# Patient Record
Sex: FEMALE | Race: BLACK OR AFRICAN AMERICAN | NOT HISPANIC OR LATINO | Employment: UNEMPLOYED | URBAN - METROPOLITAN AREA
[De-identification: names, ages, dates, MRNs, and addresses within clinical notes are randomized per-mention and may not be internally consistent; named-entity substitution may affect disease eponyms.]

---

## 2024-09-25 ENCOUNTER — CLINICAL SUPPORT (OUTPATIENT)
Dept: OPHTHALMOLOGY | Facility: CLINIC | Age: 59
End: 2024-09-25

## 2024-09-25 ENCOUNTER — HOSPITAL ENCOUNTER (EMERGENCY)
Facility: HOSPITAL | Age: 59
Discharge: HOME | End: 2024-09-25
Attending: EMERGENCY MEDICINE
Payer: MEDICAID

## 2024-09-25 VITALS
RESPIRATION RATE: 18 BRPM | BODY MASS INDEX: 40.97 KG/M2 | WEIGHT: 240 LBS | HEIGHT: 64 IN | DIASTOLIC BLOOD PRESSURE: 79 MMHG | HEART RATE: 71 BPM | TEMPERATURE: 98 F | SYSTOLIC BLOOD PRESSURE: 161 MMHG | OXYGEN SATURATION: 100 %

## 2024-09-25 VITALS — BODY MASS INDEX: 40.95 KG/M2 | HEIGHT: 64 IN | WEIGHT: 239.88 LBS

## 2024-09-25 DIAGNOSIS — H53.8 BLURRY VISION, RIGHT EYE: Primary | ICD-10-CM

## 2024-09-25 DIAGNOSIS — H04.123 DRY EYE SYNDROME OF BOTH EYES: ICD-10-CM

## 2024-09-25 DIAGNOSIS — H43.812 POSTERIOR VITREOUS DEGENERATION, LEFT: ICD-10-CM

## 2024-09-25 DIAGNOSIS — H25.813 COMBINED FORMS OF AGE-RELATED CATARACT OF BOTH EYES: ICD-10-CM

## 2024-09-25 DIAGNOSIS — H02.889 MEIBOMIAN GLAND DYSFUNCTION (MGD): ICD-10-CM

## 2024-09-25 DIAGNOSIS — Z00.00 HEALTHCARE MAINTENANCE: Primary | ICD-10-CM

## 2024-09-25 DIAGNOSIS — H43.392 VITREOUS FLOATERS OF LEFT EYE: ICD-10-CM

## 2024-09-25 PROCEDURE — 99213 OFFICE O/P EST LOW 20 MIN: CPT | Mod: PBBFAC,PN,25

## 2024-09-25 PROCEDURE — 92136 OPHTHALMIC BIOMETRY: CPT | Mod: PBBFAC,PN

## 2024-09-25 PROCEDURE — 92134 CPTRZ OPH DX IMG PST SGM RTA: CPT | Mod: PBBFAC,PN | Performed by: OPHTHALMOLOGY

## 2024-09-25 PROCEDURE — 99283 EMERGENCY DEPT VISIT LOW MDM: CPT | Mod: 25,27

## 2024-09-25 PROCEDURE — 92025 CPTRIZED CORNEAL TOPOGRAPHY: CPT | Mod: PBBFAC,PN | Performed by: OPHTHALMOLOGY

## 2024-09-25 PROCEDURE — 92134 CPTRZ OPH DX IMG PST SGM RTA: CPT | Mod: PBBFAC,PN

## 2024-09-25 PROCEDURE — 92136 OPHTHALMIC BIOMETRY: CPT | Mod: PBBFAC,PN | Performed by: OPHTHALMOLOGY

## 2024-09-25 PROCEDURE — 25000003 PHARM REV CODE 250: Performed by: EMERGENCY MEDICINE

## 2024-09-25 RX ORDER — TETRACAINE HYDROCHLORIDE 5 MG/ML
2 SOLUTION OPHTHALMIC
Status: COMPLETED | OUTPATIENT
Start: 2024-09-25 | End: 2024-09-25

## 2024-09-25 RX ADMIN — TETRACAINE HYDROCHLORIDE 2 DROP: 5 SOLUTION OPHTHALMIC at 11:09

## 2024-09-25 RX ADMIN — FLUORESCEIN SODIUM 1 EACH: 1 STRIP OPHTHALMIC at 11:09

## 2024-09-25 NOTE — PROGRESS NOTES
HPI     Spots and/or Floaters     Additional comments: In OS x two weeks            Comments    ED follow up  With no vision changes , flashes of light or veil   Patient has never had an eye exam and has not had a PCP or doctor's   appointment since she was a child.  Would like to be referred to Internal medicine if possible          Last edited by Jerica Chaney MA on 9/25/2024  2:41 PM.            Assessment /Plan     For exam results, see Encounter Report.    There are no diagnoses linked to this encounter.    OCT RNFL 9/25/24  OD: 88 all green  OS: 88 all green    OCT Mac 9/25/24  OD: Normal foveal contour, no IRF/SRF  OS: Normal foveal contour, no IRF/SRF    Corneal Topography 9/25/24  OD: 43.04, 42.64, 43.45 (0.81D astig)  OS: 43.64, 43.28, 44.01 (0.73D astig)    Symptomatic PVD OS  - 2 week history of single floater OS  - denies flashes/shower of floaters/curtains/veils  - PVD with Gonzalez ring noted on exam, no h/t/d on DFE, fundus photos today  - RD precautions discussed, discussed increased likelihood occurring OD  - RTC 3-4 weeks for repeat DFE    2. YUMIKO      MGD  - PEE OU  - start Ats QID, WC/LS    3. NSC + CSC OU  - 2+ NSC and 2+ CSC OD  - 1-2+ NSC/CSC OS  - Likely VS OD, patient symptomatic with glare  - BCVA 20/50 // 20/25  - IOL Calcs done today, RTC 3-4 weeks for cat eval  - New Mrx provided today    4. Healthcare Maintenance  - Pt not seen doctor since a child  - referred to IM to establish care    RTC 3-4 weeks for repeat DFE and cat eval

## 2024-09-25 NOTE — ED PROVIDER NOTES
Encounter Date: 9/25/2024       History     Chief Complaint   Patient presents with    Eye Problem     C/o floaters in left eye for 2 weeks and right eye blurry vision x 1 week. No other complaints at this time.      The history is provided by the patient.   Eye Problem  This is a new problem. The current episode started more than 1 week ago. The problem occurs daily. The problem has not changed since onset.Pertinent negatives include no chest pain, no abdominal pain, no headaches and no shortness of breath. Nothing aggravates the symptoms. Nothing relieves the symptoms.     Review of patient's allergies indicates:  No Known Allergies  History reviewed. No pertinent past medical history.  History reviewed. No pertinent surgical history.  No family history on file.  Social History     Tobacco Use    Smoking status: Never    Smokeless tobacco: Never   Substance Use Topics    Alcohol use: Not Currently    Drug use: Not Currently     Review of Systems   Constitutional:  Negative for fever.   HENT: Negative.  Negative for congestion and sore throat.    Eyes: Negative.    Respiratory: Negative.  Negative for cough and shortness of breath.    Cardiovascular:  Negative for chest pain.   Gastrointestinal:  Negative for abdominal pain, nausea and vomiting.   Genitourinary:  Negative for dysuria.   Neurological:  Negative for weakness, numbness and headaches.   Psychiatric/Behavioral:  Negative for confusion.        Physical Exam     Initial Vitals [09/25/24 1114]   BP Pulse Resp Temp SpO2   (!) 161/79 71 18 97.8 °F (36.6 °C) 100 %      MAP       --         Physical Exam    Constitutional: She appears well-developed and well-nourished. No distress.   HENT:   Head: Normocephalic and atraumatic.   Eyes: Conjunctivae and lids are normal. Pupils are equal, round, and reactive to light. Lids are everted and swept, no foreign bodies found.   Slit lamp exam:       The right eye shows no corneal abrasion and no fluorescein uptake.         The left eye shows no corneal abrasion and no fluorescein uptake.   Pressures - OS - 25  OD 28   Neck: Neck supple.   Normal range of motion.  Cardiovascular:  Normal rate, regular rhythm and normal heart sounds.           Pulmonary/Chest: Breath sounds normal.   Abdominal: Abdomen is soft. Bowel sounds are normal. She exhibits no distension. There is no abdominal tenderness. There is no rebound.   Musculoskeletal:         General: No edema. Normal range of motion.      Cervical back: Normal range of motion and neck supple.     Neurological: She is alert and oriented to person, place, and time. She has normal strength.   Skin: Skin is warm and dry.   Psychiatric: She has a normal mood and affect.         ED Course   Procedures  Labs Reviewed - No data to display       Imaging Results    None          Medications   fluorescein ophthalmic strip 1 each (1 each Right Eye Given 9/25/24 1132)   TETRAcaine HCl (PF) 0.5 % Drop 2 drop (2 drops Right Eye Given by Other 9/25/24 1132)     Medical Decision Making  DDx: blurry vision, floaters    Amount and/or Complexity of Data Reviewed  Discussion of management or test interpretation with external provider(s): Patient with floaters to left eye for the last two weeks, and blurry vision to the right eye for the last week.  Workup in ED un revealing except for slightly elevated IOP.  Discussed case with Ophthalmology resident, and they will see her in clinic today.      Risk  Prescription drug management.                                      Clinical Impression:  Final diagnoses:  [H53.8] Blurry vision, right eye (Primary)  [H43.392] Vitreous floaters of left eye          ED Disposition Condition    Discharge Stable          ED Prescriptions    None       Follow-up Information       Follow up With Specialties Details Why Contact Info    Ophthalmology clinic                 Te De La Cruz MD  09/25/24 [id number]

## 2024-10-22 ENCOUNTER — OFFICE VISIT (OUTPATIENT)
Dept: OPHTHALMOLOGY | Facility: CLINIC | Age: 59
End: 2024-10-22
Payer: MEDICAID

## 2024-10-22 VITALS — WEIGHT: 239.88 LBS | BODY MASS INDEX: 40.95 KG/M2 | HEIGHT: 64 IN

## 2024-10-22 DIAGNOSIS — H04.123 DRY EYE SYNDROME OF BOTH EYES: ICD-10-CM

## 2024-10-22 DIAGNOSIS — H25.813 COMBINED FORMS OF AGE-RELATED CATARACT OF BOTH EYES: ICD-10-CM

## 2024-10-22 DIAGNOSIS — H43.812 POSTERIOR VITREOUS DEGENERATION, LEFT: Primary | ICD-10-CM

## 2024-10-22 PROCEDURE — 99212 OFFICE O/P EST SF 10 MIN: CPT | Mod: PBBFAC,PN

## 2024-10-22 NOTE — PROGRESS NOTES
HPI    RTC 3-4 weeks for repeat DFE and cat eval  Last edited by Renata Santiago MA on 10/22/2024  8:48 AM.            Assessment /Plan     For exam results, see Encounter Report.    There are no diagnoses linked to this encounter.      OCT RNFL 9/25/24  OD: 88 all green  OS: 88 all green    OCT Mac 9/25/24  OD: Normal foveal contour, no IRF/SRF  OS: Normal foveal contour, no IRF/SRF    Corneal Topography 9/25/24  OD: 43.04, 42.64, 43.45 (0.81D astig)  OS: 43.64, 43.28, 44.01 (0.73D astig)    Symptomatic PVD OS  - 2 week history of single floater OS  - denies flashes/shower of floaters/curtains/veils  - PVD with Gonzalez ring noted on exam, no h/t/d on DFE, fundus photos today  - RD precautions discussed, discussed increased likelihood occurring OD  - 10/22/24: No H/T/D seen, though difficult view through cortical cataract. Discussed strict return precautions.     2. YUMIKO      MGD  - PEE OU  - start Ats QID, WC/LS    3. Combined cataract OD  - Likely VS OD, patient symptomatic with glare  - BCVA 20/50 // 20/25  - IOL Calcs done today  - patient needs to see primary care prior to booking surgery  - 10/22/24: Given information sheet on cataract surgery. Patient will call if she's interested in surgery.     4. Combined cataract OS  - NVS, BCVA 20/25 9/25/24    5. Healthcare Maintenance  - Pt not seen doctor since a child  - referred to IM to establish care    RTC 1 year DFE, sooner prn

## 2024-10-23 PROBLEM — H43.812: Status: ACTIVE | Noted: 2024-10-23
